# Patient Record
Sex: MALE | Race: WHITE | HISPANIC OR LATINO | Employment: FULL TIME | URBAN - METROPOLITAN AREA
[De-identification: names, ages, dates, MRNs, and addresses within clinical notes are randomized per-mention and may not be internally consistent; named-entity substitution may affect disease eponyms.]

---

## 2017-10-07 ENCOUNTER — HOSPITAL ENCOUNTER (EMERGENCY)
Facility: MEDICAL CENTER | Age: 37
End: 2017-10-07
Attending: EMERGENCY MEDICINE
Payer: COMMERCIAL

## 2017-10-07 ENCOUNTER — APPOINTMENT (OUTPATIENT)
Dept: RADIOLOGY | Facility: MEDICAL CENTER | Age: 37
End: 2017-10-07
Attending: EMERGENCY MEDICINE
Payer: COMMERCIAL

## 2017-10-07 VITALS
SYSTOLIC BLOOD PRESSURE: 135 MMHG | HEIGHT: 70 IN | OXYGEN SATURATION: 96 % | TEMPERATURE: 98 F | BODY MASS INDEX: 45.1 KG/M2 | HEART RATE: 86 BPM | DIASTOLIC BLOOD PRESSURE: 67 MMHG | RESPIRATION RATE: 16 BRPM | WEIGHT: 315 LBS

## 2017-10-07 DIAGNOSIS — S83.402A SPRAIN OF COLLATERAL LIGAMENT OF LEFT KNEE, INITIAL ENCOUNTER: ICD-10-CM

## 2017-10-07 PROCEDURE — 73564 X-RAY EXAM KNEE 4 OR MORE: CPT | Mod: LT

## 2017-10-07 PROCEDURE — 700102 HCHG RX REV CODE 250 W/ 637 OVERRIDE(OP): Performed by: EMERGENCY MEDICINE

## 2017-10-07 PROCEDURE — A9270 NON-COVERED ITEM OR SERVICE: HCPCS | Performed by: EMERGENCY MEDICINE

## 2017-10-07 PROCEDURE — 99284 EMERGENCY DEPT VISIT MOD MDM: CPT

## 2017-10-07 RX ORDER — TRAMADOL HYDROCHLORIDE 50 MG/1
50-100 TABLET ORAL EVERY 6 HOURS PRN
Qty: 20 TAB | Refills: 0 | Status: ON HOLD | OUTPATIENT
Start: 2017-10-07 | End: 2017-10-24

## 2017-10-07 RX ORDER — IBUPROFEN 800 MG/1
800 TABLET ORAL EVERY 8 HOURS PRN
Qty: 30 TAB | Refills: 0 | Status: SHIPPED | OUTPATIENT
Start: 2017-10-07

## 2017-10-07 RX ORDER — HYDROCODONE BITARTRATE AND ACETAMINOPHEN 5; 325 MG/1; MG/1
2 TABLET ORAL ONCE
Status: COMPLETED | OUTPATIENT
Start: 2017-10-07 | End: 2017-10-07

## 2017-10-07 RX ADMIN — HYDROCODONE BITARTRATE AND ACETAMINOPHEN 2 TABLET: 5; 325 TABLET ORAL at 16:08

## 2017-10-07 ASSESSMENT — PAIN SCALES - GENERAL
PAINLEVEL_OUTOF10: 7
PAINLEVEL_OUTOF10: 9
PAINLEVEL_OUTOF10: 10

## 2017-10-07 NOTE — ED PROVIDER NOTES
"ED Provider Note    CHIEF COMPLAINT  Chief Complaint   Patient presents with   • Knee Pain     L knee, states that he was stepping down onto a step while hiking with his kids when his L knee buckled and he fell.        HPI  Ariel Mccormick is a 37 y.o. male who presentsFor evaluation of acute left knee injury. The patient was hiking with his kids. He was descending and felt a snapping popping sensation in his left knee. He experienced immediate pain and swelling. He was unable to bear weight without subjective instability. He denies any injury to the head neck chest abdomen or pelvis. Injury occurred 2 hours prior to arrival. No other complaints    REVIEW OF SYSTEMS  See HPI for further details. No loss of consciousness numbness tingling weakness fevers or chills All other systems are negative.     PAST MEDICAL HISTORY  No past medical history on file.  None reported  FAMILY HISTORY  History of bleeding disorder    SOCIAL HISTORY  Social History     Social History   • Marital status: N/A     Spouse name: N/A   • Number of children: N/A   • Years of education: N/A     Social History Main Topics   • Smoking status: Not on file   • Smokeless tobacco: Not on file   • Alcohol use Not on file   • Drug use: Unknown   • Sexual activity: Not on file     Other Topics Concern   • Not on file     Social History Narrative   • No narrative on file   Denies drugs or alcohol  with kids    SURGICAL HISTORY  No past surgical history on file.  No major surgeries  CURRENT MEDICATIONS  No regular medications    ALLERGIES  No Known Allergies    PHYSICAL EXAM  VITAL SIGNS: /66   Pulse 87   Temp 36.3 °C (97.3 °F)   Resp 16   Ht 1.778 m (5' 10\")   Wt (!) 163.9 kg (361 lb 5.3 oz)   SpO2 95%   BMI 51.85 kg/m²  Room air O2: 95    Constitutional: Well developed, Well nourished, No acute distress, Non-toxic appearance.   HENT: Normocephalic, Atraumatic, Bilateral external ears normal, Oropharynx moist, No oral exudates, " Nose normal.   Eyes: PERRLA, EOMI, Conjunctiva normal, No discharge.   Neck: Normal range of motion, No tenderness, Supple, No stridor.   Cardiovascular: Normal heart rate, Normal rhythm, No murmurs, No rubs, No gallops.   Thorax & Lungs: Normal breath sounds, No respiratory distress, No wheezing, No chest tenderness.   Abdomen: Bowel sounds normal, Soft, No tenderness, No masses, No pulsatile masses.   Skin: Warm, Dry, No erythema, No rash.   Back: No tenderness, No CVA tenderness.   Extremities: Left knee exam is notable for moderate size effusion he could not tolerate ligamentous testing neurovascular exam distally is normal patient cannot fully extend, concerning for possible patellar tendon disruption  Neurologic: Alert & oriented x 3, Normal motor function, Normal sensory function, No focal deficits noted.   Psychiatric: Affect normal, Judgment normal, Mood normal.     RADIOLOGY/PROCEDURES  DX-KNEE COMPLETE 4+ LEFT   Final Result      No evidence of acute fracture or dislocation.      Small joint effusion.      Swelling of the anterior soft tissues of the distal thigh.            COURSE & MEDICAL DECISION MAKING  Pertinent Labs & Imaging studies reviewed. (See chart for details)  Patient given oral pain medicine. Here I suspect he likely has some either internal ligamentous disruption such as ACL PCL etc. and/or possible quadricep and/or patellar tendon disruption. He will require urgent follow-up with Dr. Mckeon with orthopedics as well as outpatient MRI. I placed him in a knee immobilizer and crutches and will provide a prescription for pain medicine and anti-inflammatories    FINAL IMPRESSION  1.   1. Sprain of collateral ligament of left knee, initial encounter      Stable patellar tendon disruption/quad tear    Electronically signed by: Rene Mercer, 10/7/2017 3:47 PM

## 2017-10-07 NOTE — ED NOTES
Ariel Mccormick  37 y.o.  Chief Complaint   Patient presents with   • Knee Pain     L knee, states that he was stepping down onto a step while hiking with his kids when his L knee buckled and he fell.      Pt in obvious discomfort.

## 2017-10-08 NOTE — DISCHARGE INSTRUCTIONS
"Combined Knee Ligament Sprain  Combined knee ligament sprain is a tear of more than one of the major ligaments of the knee. The four knee ligaments are the anterior cruciate ligament (ACL), posterior cruciate ligament (PCL), medial collateral ligament (MCL) and lateral collateral ligament (LCL). Ligaments connect bones. They often cross a joint to hold the bones together. The ligaments of the knee keep the thigh bone (femur) and shinbone (tibia) in alignment. These ligaments allow the joint to move within a certain range of motion. Movement outside this range causes a ligament strain. Injury to multiple ligaments at the same time results in difficulty playing sports and in daily living. The most common multiple knee ligament injury involves the ACL and MCL.  SYMPTOMS   · A \"popping\" sound heard or felt at the time of injury.  · Inability to continue activity after injury.  · Inflammation of the knee within 6 hours after injury.  · Possibly, deformity of the knee.  · Inability to straighten the knee.  · Feeling of the knee giving way or buckling.  · Sometimes, locking of the knee, if the joint cartilage (meniscus) is injured.  · Rarely, numbness, weakness, paralysis, discoloration, or coldness, due to nerve or blood vessel injury.  CAUSES   Spraining of multiple ligaments occurs when a force is placed on the ligaments that exceeds their strength. This is often caused by a direct hit (trauma). It may also be caused by a non-contact injury (hyperextending the knee while twisting it).   RISK INCREASES WITH:  · Contact sports (football, rugby, lacrosse). Sports that involve pivoting, jumping, cutting, or changing direction (basketball, gymnastics, soccer, volleyball). Sports on uneven ground (cross-country running, soccer).  · Poor strength and/or flexibility.  · Improper fitted or padded equipment.  PREVENTION  · Warm up and stretch properly before activity.  · Maintain physical fitness:  ¨ Thigh, leg, and knee " flexibility.  ¨ Muscle strength and endurance.  · Learn and use proper exercise technique.  · Wear proper and well fitting equipment (correct length of cleats for surface).  PROGNOSIS   Without treatment, the knee will continue to give way and become vulnerable to recurring injury. Recurring injury can happen during athletics or daily living. If the injury includes damage to a nerve or artery, the chance of a poor outcome increases. Surgery is often needed to regain stability of the knee.  RELATED COMPLICATIONS   Frequently recurring symptoms, including:  · Knee giving way.  · Joint instability.  · Inflammation.  · Injury to the joint cartilage (meniscus). This may result in locking and/or swelling of the knee.  · Injury to joint (articular) cartilage of the thigh bone or shinbone. This may result in arthritis of the knee.  · Injury to other ligaments of the knee.  · Knee stiffness (loss of knee motion).  · Permanent injury to nerves (numbness, weakness, or paralysis) or arteries.  · Removal (amputation) of the leg, due to nerve or artery injury.  TREATMENT   Treatment first involves medicine and ice, to reduce pain and inflammation. Crutches may be advised, to decrease pain while walking. The knee may be restrained. Rehabilitation focuses on reducing swelling, regaining range of motion, and regaining muscle control and strength. It may also include receiving proper use training, wearing a brace, and education. (Avoid sports that involve pivoting, cutting, changing direction, jumping and landing). Surgery often offers the best chance for full recovery. Surgery from combined ACL/MCL injury involves replacement (reconstruction) of the ACL. This also allows for MCL healing. Despite surgery, some athletes may never return to their prior level of competition. The ability to return to sports depends on the related injuries and demands of the sport.   MEDICATION   · If pain medicine is needed, nonsteroidal  anti-inflammatory medicines (aspirin and ibuprofen), or other minor pain relievers (acetaminophen), are often advised.  · Do not take pain medicine for 7 days before surgery.  · Stronger pain relievers may be prescribed. Use only as directed and only as much as you need.  · Contact your caregiver immediately if any bleeding, stomach upset, or signs of an allergic reaction occur.  COLD THERAPY   Cold treatment (icing) should be applied for 10 to 15 minutes every 2 to 3 hours for inflammation and pain, and immediately after activity that aggravates your symptoms. Use ice packs or an ice massage.  SEEK MEDICAL CARE IF:   · Symptoms get worse or do not improve in 6 weeks, despite treatment.  · After injury or surgery, any of the following occur:  ¨ Pain, numbness, coldness, or a blue, gray, or dark color occurs in the foot or toenails.  ¨ Increased pain, swelling, redness, drainage of fluids, or bleeding in the affected area.  ¨ Signs of infection (headache, muscle aches, dizziness, or a general ill feeling with fever).  ¨ New, unexplained symptoms develop. (Drugs used in treatment may produce side effects.)     This information is not intended to replace advice given to you by your health care provider. Make sure you discuss any questions you have with your health care provider.     Document Released: 12/18/2006 Document Revised: 03/11/2013 Document Reviewed: 04/01/2010  [employer] Interactive Patient Education ©2016 [employer]

## 2017-10-08 NOTE — ED NOTES
Knee immobilizers and crutches provided by ED tech. Patient reports readiness for discharge. Discharge instructions, prescriptions x 2, and follow-up care reviewed with patient. All questions answered and patient verbalized understanding. Vss. Patient stable and ambulatory upon d/c from ED.

## 2017-10-24 ENCOUNTER — HOSPITAL ENCOUNTER (OUTPATIENT)
Facility: MEDICAL CENTER | Age: 37
End: 2017-10-24
Attending: ORTHOPAEDIC SURGERY | Admitting: ORTHOPAEDIC SURGERY
Payer: COMMERCIAL

## 2017-10-24 VITALS
DIASTOLIC BLOOD PRESSURE: 54 MMHG | OXYGEN SATURATION: 95 % | BODY MASS INDEX: 45.1 KG/M2 | HEIGHT: 70 IN | WEIGHT: 315 LBS | RESPIRATION RATE: 17 BRPM | SYSTOLIC BLOOD PRESSURE: 141 MMHG | HEART RATE: 85 BPM | TEMPERATURE: 98.3 F

## 2017-10-24 PROBLEM — S76.192A: Status: ACTIVE | Noted: 2017-10-24

## 2017-10-24 LAB — EKG IMPRESSION: NORMAL

## 2017-10-24 PROCEDURE — 93005 ELECTROCARDIOGRAM TRACING: CPT | Performed by: ORTHOPAEDIC SURGERY

## 2017-10-24 PROCEDURE — 500122 HCHG BOVIE, BLADE: Performed by: ORTHOPAEDIC SURGERY

## 2017-10-24 PROCEDURE — 160028 HCHG SURGERY MINUTES - 1ST 30 MINS LEVEL 3: Performed by: ORTHOPAEDIC SURGERY

## 2017-10-24 PROCEDURE — A9270 NON-COVERED ITEM OR SERVICE: HCPCS

## 2017-10-24 PROCEDURE — 160035 HCHG PACU - 1ST 60 MINS PHASE I: Performed by: ORTHOPAEDIC SURGERY

## 2017-10-24 PROCEDURE — 160046 HCHG PACU - 1ST 60 MINS PHASE II: Performed by: ORTHOPAEDIC SURGERY

## 2017-10-24 PROCEDURE — 160002 HCHG RECOVERY MINUTES (STAT): Performed by: ORTHOPAEDIC SURGERY

## 2017-10-24 PROCEDURE — 93010 ELECTROCARDIOGRAM REPORT: CPT | Performed by: INTERNAL MEDICINE

## 2017-10-24 PROCEDURE — 160047 HCHG PACU  - EA ADDL 30 MINS PHASE II: Performed by: ORTHOPAEDIC SURGERY

## 2017-10-24 PROCEDURE — 160009 HCHG ANES TIME/MIN: Performed by: ORTHOPAEDIC SURGERY

## 2017-10-24 PROCEDURE — 700102 HCHG RX REV CODE 250 W/ 637 OVERRIDE(OP)

## 2017-10-24 PROCEDURE — 500031: Performed by: ORTHOPAEDIC SURGERY

## 2017-10-24 PROCEDURE — 500881 HCHG PACK, EXTREMITY: Performed by: ORTHOPAEDIC SURGERY

## 2017-10-24 PROCEDURE — 700111 HCHG RX REV CODE 636 W/ 250 OVERRIDE (IP)

## 2017-10-24 PROCEDURE — 160039 HCHG SURGERY MINUTES - EA ADDL 1 MIN LEVEL 3: Performed by: ORTHOPAEDIC SURGERY

## 2017-10-24 PROCEDURE — 501838 HCHG SUTURE GENERAL: Performed by: ORTHOPAEDIC SURGERY

## 2017-10-24 PROCEDURE — 160025 RECOVERY II MINUTES (STATS): Performed by: ORTHOPAEDIC SURGERY

## 2017-10-24 PROCEDURE — 160048 HCHG OR STATISTICAL LEVEL 1-5: Performed by: ORTHOPAEDIC SURGERY

## 2017-10-24 PROCEDURE — 160036 HCHG PACU - EA ADDL 30 MINS PHASE I: Performed by: ORTHOPAEDIC SURGERY

## 2017-10-24 RX ORDER — LIDOCAINE HYDROCHLORIDE 10 MG/ML
0.5 INJECTION, SOLUTION INFILTRATION; PERINEURAL
Status: DISCONTINUED | OUTPATIENT
Start: 2017-10-24 | End: 2017-10-24 | Stop reason: HOSPADM

## 2017-10-24 RX ORDER — LISINOPRIL 40 MG/1
40 TABLET ORAL DAILY
COMMUNITY

## 2017-10-24 RX ORDER — OXYCODONE HCL 5 MG/5 ML
SOLUTION, ORAL ORAL
Status: COMPLETED
Start: 2017-10-24 | End: 2017-10-24

## 2017-10-24 RX ORDER — OXYCODONE AND ACETAMINOPHEN 10; 325 MG/1; MG/1
.5-1 TABLET ORAL EVERY 4 HOURS PRN
Qty: 60 TAB | Refills: 0 | Status: SHIPPED | OUTPATIENT
Start: 2017-10-24

## 2017-10-24 RX ORDER — HYDROCHLOROTHIAZIDE 25 MG/1
25 TABLET ORAL DAILY
COMMUNITY

## 2017-10-24 RX ORDER — HALOPERIDOL 5 MG/ML
INJECTION INTRAMUSCULAR
Status: COMPLETED
Start: 2017-10-24 | End: 2017-10-24

## 2017-10-24 RX ORDER — LIDOCAINE AND PRILOCAINE 25; 25 MG/G; MG/G
1 CREAM TOPICAL
Status: DISCONTINUED | OUTPATIENT
Start: 2017-10-24 | End: 2017-10-24 | Stop reason: HOSPADM

## 2017-10-24 RX ORDER — SODIUM CHLORIDE, SODIUM LACTATE, POTASSIUM CHLORIDE, CALCIUM CHLORIDE 600; 310; 30; 20 MG/100ML; MG/100ML; MG/100ML; MG/100ML
INJECTION, SOLUTION INTRAVENOUS CONTINUOUS
Status: DISCONTINUED | OUTPATIENT
Start: 2017-10-24 | End: 2017-10-24 | Stop reason: HOSPADM

## 2017-10-24 RX ORDER — DIPHENHYDRAMINE HYDROCHLORIDE 50 MG/ML
INJECTION INTRAMUSCULAR; INTRAVENOUS
Status: COMPLETED
Start: 2017-10-24 | End: 2017-10-24

## 2017-10-24 RX ORDER — HYDROCODONE BITARTRATE AND ACETAMINOPHEN 5; 325 MG/1; MG/1
1 TABLET ORAL
Status: ON HOLD | COMMUNITY
End: 2017-10-24

## 2017-10-24 RX ADMIN — SODIUM CHLORIDE, SODIUM LACTATE, POTASSIUM CHLORIDE, CALCIUM CHLORIDE: 600; 310; 30; 20 INJECTION, SOLUTION INTRAVENOUS at 11:52

## 2017-10-24 RX ADMIN — OXYCODONE HYDROCHLORIDE 10 MG: 5 SOLUTION ORAL at 16:45

## 2017-10-24 RX ADMIN — HALOPERIDOL LACTATE 1 MG: 5 INJECTION, SOLUTION INTRAMUSCULAR at 16:05

## 2017-10-24 RX ADMIN — DIPHENHYDRAMINE HYDROCHLORIDE 12.5 MG: 50 INJECTION INTRAMUSCULAR; INTRAVENOUS at 16:22

## 2017-10-24 ASSESSMENT — PAIN SCALES - GENERAL
PAINLEVEL_OUTOF10: 0
PAINLEVEL_OUTOF10: 2
PAINLEVEL_OUTOF10: 0
PAINLEVEL_OUTOF10: 2
PAINLEVEL_OUTOF10: 0
PAINLEVEL_OUTOF10: 2
PAINLEVEL_OUTOF10: 6
PAINLEVEL_OUTOF10: 4

## 2017-10-24 NOTE — OP REPORT
DATE OF SERVICE:  10/24/2017    PREOPERATIVE DIAGNOSIS:  Left quadriceps tendon rupture.    POSTOPERATIVE DIAGNOSIS:  Left quadriceps tendon rupture.    PROCEDURE PERFORMED:  Repair of left quadriceps tendon rupture.    SURGEON:  Festus Mckeon MD    ANESTHESIOLOGIST:  Eitan Munoz DO    ANESTHESIA:  General and regional.    ASSISTANT:  Atif Young PA-C    ESTIMATED BLOOD LOSS:  Minimal.    TOURNIQUET TIME:  45 minutes at 250 mmHg, left thigh.    INDICATION FOR PROCEDURE:  The patient is a 37-year-old male who is about 2   weeks status post rupture of his quadriceps tendon with some portions of the   deep fibers intact, but otherwise his rectus and vastus lateralis and medialis   complete tears.  This was confirmed on MRI.  I discussed with him treatment   options.  He elected to proceed with surgical repair.  He signed informed   consent preoperatively.    DESCRIPTION OF PROCEDURE:  The patient was met in the preoperative holding   area.  His surgical site was signed and his consent was confirmed for   accuracy.  Dr. Munoz performed a regional anesthetic at my request to help   with postoperative pain control.  He was then taken back to the operating room   and general anesthesia was induced.  Ancef was administered.  Tourniquet was   applied to the left thigh.  The left lower extremity was then prepped and   draped in the usual sterile fashion.  A formal timeout was performed to   confirm patient's correct name, correct surgical site, correct procedure and   correct laterality.  The limb was then exsanguinated with an Esmarch and the   tourniquet was inflated to 250 mmHg.  A midline longitudinal incision was made   over the quadriceps tendon within scalpel down through skin.  Dissection was   carried down to subcutaneous tissue to the fascia overlying the quadriceps   tendon and the patella.  This was split longitudinally.  There was serous   fluid present within the rupture site at the quadriceps  tendon.  There was   complete rupture off of the bone at the superficial portion of the quadriceps   tendon with some retraction proximally.  The deep fibers were intact   centrally.  There was some rupture of the retinaculum in a transverse fashion   medially and laterally.  Thorough preparation of the superficial insertion   area of the bone on patella was performed with a rongeur.  I cleaned up the   early healing tissue off of the end of the quadriceps tendon in the area of   the patella.  I then drilled three 2.0 mm drill holes through the patellar   tendon, I have said that I had prepared in the proximal pole.  I then used a   total of two #5 FiberWire suture in a locking running Krackow type   configuration to medial and lateral aspects and the central aspect of the   patellar tendon and then brought the sutures through the bone tunnels and   securely tied them down into place without undue tension.  I then reinforced   the superficial layer with running #2 FiberWire as well as the retinaculum   with #2 FiberWire.  The wound was thoroughly irrigated.  I repaired the fascia   layers with interrupted #1 Vicryl, subQ layers with 0 Vicryl, 2-0 Vicryl, and   the skin edges with staples.  Wound was thoroughly cleansed and dried and a   sterile compressive dressing was applied.  Tourniquet was deflated after 45   minutes and good blood flow returned to the foot.  He was then placed into a   hinged knee brace locked in full extension.  He was then awoken from   anesthesia and transferred on the [facility] and taken to postanesthesia care unit   in stable condition.    Atif Young PA-C, was present for the duration of the procedure and assisted   with patient positioning, retracting and wound closure.    PLAN:  1.  The patient will be discharged home from the PACU when he recovers from   anesthesia.  2.  He can be weightbearing as tolerated in his knee brace locked in full   extension, but should not perform any knee  flexion.  3.  He should follow up in 10-14 days postop for routine wound check.  4.  I will discharge him home with a prescription for Percocet as needed for   pain.       ____________________________________     MD ERVIN Henry / [facility]    DD:  10/24/2017 15:47:57  DT:  10/24/2017 16:03:36    D#:  [id number]  Job#:  [id number]

## 2017-10-25 NOTE — DISCHARGE INSTRUCTIONS
ACTIVITY: Rest and take it easy for the first 24 hours.  A responsible adult is recommended to remain with you during that time.  It is normal to feel sleepy.  We encourage you to not do anything that requires balance, judgment or coordination.    MILD FLU-LIKE SYMPTOMS ARE NORMAL. YOU MAY EXPERIENCE GENERALIZED MUSCLE ACHES, THROAT IRRITATION, HEADACHE AND/OR SOME NAUSEA.    FOR 24 HOURS DO NOT:  Drive, operate machinery or run household appliances.  Drink beer or alcoholic beverages.   Make important decisions or sign legal documents.    SPECIAL INSTRUCTIONS & SURGICAL DRESSING/BATHING:   --Okay to change dressing after 72 hrs (Friday) if needed, keep incision clean/dry/covered   --Weightbearing as tolerated left lower extremity in knee brace locked in extension only   --No knee flexion   --Prescription for percocet as needed for pain   --Follow up in 10-14 days postop    Peripheral Nerve Block Discharge Instructions from Same Day Surgery and Inpatient :    What to Expect - Lower Extremity  · The block may cause you to experience numbness and weakness in your hip and thigh or thigh and knee on the same side as your surgery  · Numbness, tingling and / or weakness are all normal. For some people, this may be an unpleasant sensation  · These issues will be resolved when the local anesthetic wears off   · You may experience numbness and tingling in your thigh on the same side as your surgery if the block medicine was injected at your groin area  · Numbness will make it difficult to walk  · You may have problems with balance and walking so be very careful   · Follow your surgeon's direction regarding weight bearing on your surgical limb  · Be very careful with your numb limb  Precautions  · The numbness may affect your balance  · Be careful when walking or moving around  · Your leg may be weak: be very careful putting weight on it  · If your surgeon did not specify a time, you should not bear weight for 24  "hours  · Be sure to ask for help when you need it  · It is better to have help than to fall and hurt yourself  Prevent Injury  · Protect the limb like a baby  · Beware of exposing your limb to extreme heat or cold or trauma  · The limb may be injured without you noticing because it is numb  · Keep the limb elevated whenever possible  · Do not sleep on the limb  · Change the position of the limb regularly  · Avoid putting pressure on your surgical limb  Pain Control  · The initial block on the day of surgery will make your extremity feel \"numb\"  · Any consecutive injection including prior to discharge from the hospital will make your extremity feel \"numb\"  · You may feel an aching or burning when the local anesthesia starts to wear off  · Take pain pills as prescribed by your surgeon  · Call your surgeon or anesthesiologist if you do not have adequate pain control    DIET: To avoid nausea, slowly advance diet as tolerated, avoiding spicy or greasy foods for the first day.  Add more substantial food to your diet according to your physician's instructions.  INCREASE FLUIDS AND FIBER TO AVOID CONSTIPATION.    FOLLOW-UP APPOINTMENT:  A follow-up appointment should be arranged with your doctor in 10-14 days; call to schedule.    You should CALL YOUR PHYSICIAN if you develop:  Fever greater than 101 degrees F.  Pain not relieved by medication, or persistent nausea or vomiting.  Excessive bleeding (blood soaking through dressing) or unexpected drainage from the wound.  Extreme redness or swelling around the incision site, drainage of pus or foul smelling drainage.  Inability to urinate or empty your bladder within 8 hours.  Problems with breathing or chest pain.    You should call 911 if you develop problems with breathing or chest pain.  If you are unable to contact your doctor or surgical center, you should go to the nearest emergency room or urgent care center.  Physician's telephone #: [phone]    If any questions " arise, call your doctor.  If your doctor is not available, please feel free to call the Surgical Center at [phone].  The Center is open Monday through Friday from 7AM to 7PM.  You can also call the HEALTH HOTLINE open 24 hours/day, 7 days/week and speak to a nurse at [phone], or toll free at [phone].    A registered nurse may call you a few days after your surgery to see how you are doing after your procedure.    MEDICATIONS: Resume taking daily medication.  Take prescribed pain medication with food.  If no medication is prescribed, you may take non-aspirin pain medication if needed.  PAIN MEDICATION CAN BE VERY CONSTIPATING.  Take a stool softener or laxative such as senokot, pericolace, or milk of magnesia if needed.    Prescription given for Percocet.  Last pain medication given at 4:45 pm.    If your physician has prescribed pain medication that includes Acetaminophen (Tylenol), do not take additional Acetaminophen (Tylenol) while taking the prescribed medication.    Depression / Suicide Risk    As you are discharged from this [facility] facility, it is important to learn how to keep safe from harming yourself.    Recognize the warning signs:  · Abrupt changes in personality, positive or negative- including increase in energy   · Giving away possessions  · Change in eating patterns- significant weight changes-  positive or negative  · Change in sleeping patterns- unable to sleep or sleeping all the time   · Unwillingness or inability to communicate  · Depression  · Unusual sadness, discouragement and loneliness  · Talk of wanting to die  · Neglect of personal appearance   · Rebelliousness- reckless behavior  · Withdrawal from people/activities they love  · Confusion- inability to concentrate     If you or a loved one observes any of these behaviors or has concerns about self-harm, here's what you can do:  · Talk about it- your feelings and reasons for harming yourself  · Remove any means  that you might use to hurt yourself (examples: pills, rope, extension cords, firearm)  · Get professional help from the community (Mental Health, Substance Abuse, psychological counseling)  · Do not be alone:Call your Safe Contact- someone whom you trust who will be there for you.  · Call your local CRISIS HOTLINE 348-4825 or 720-231-2004  · Call your local Children's Mobile Crisis Response Team [place] (630) 366-3580 or www.[employer]  · Call the toll free National Suicide Prevention Hotlines   · National Suicide Prevention Lifeline 051-139-NOMJ (5570)  National Hope Line Network 800-SUICIDE (478-0815)

## 2017-10-25 NOTE — OR NURSING
Pt had some intial nausea medicated per orders with good relief. C/o feeling hot cooling fan in place to provide more comfort. Had femerol block so pain is minimal but did medicated with oxycodone and gave script to wife to go fill at [facility]. Pt with sleep apnea has c pap at home advised to use once at home tonight. sats 94-96% on room air. Dressing to leg cdi and leg brace brought from home back on. Report to pacu 2 Rn and transported to dc area. Pt wife made aware of plan of care and dc plan.

## 2023-05-25 NOTE — ED NOTES
Patient transported to imaging.   Adbry Pregnancy And Lactation Text: It is unknown if this medication will adversely affect pregnancy or breast feeding.

## (undated) DEVICE — SET EXTENSION WITH 2 PORTS (48EA/CA) ***PART #2C8610 IS A SUBSTITUTE*****

## (undated) DEVICE — DETERGENT RENUZYME PLUS 10 OZ PACKET (50/BX)

## (undated) DEVICE — NEPTUNE 4 PORT MANIFOLD - (20/PK)

## (undated) DEVICE — CORDS BIPOLAR COAGULATION - 12FT STERILE DISP. (10EA/BX)

## (undated) DEVICE — BOVIE BLADE COATED - (50/PK)

## (undated) DEVICE — TUBING CLEARLINK DUO-VENT - C-FLO (48EA/CA)

## (undated) DEVICE — STOCKINETTE, TUBULAR 4 COTTON

## (undated) DEVICE — ELECTRODE DUAL RETURN W/ CORD - (50/PK)

## (undated) DEVICE — GLOVE BIOGEL INDICATOR SZ 7SURGICAL PF LTX - (50/BX 4BX/CA)

## (undated) DEVICE — CHLORAPREP 26 ML APPLICATOR - ORANGE TINT(25/CA)

## (undated) DEVICE — KIT ROOM DECONTAMINATION

## (undated) DEVICE — KIT ANESTHESIA W/CIRCUIT & 3/LT BAG W/FILTER (20EA/CA)

## (undated) DEVICE — LACTATED RINGERS INJ 1000 ML - (14EA/CA 60CA/PF)

## (undated) DEVICE — SUTURE 4-0 PROLENE PS-2 18 (36PK/BX)"

## (undated) DEVICE — GOWN WARMING STANDARD FLEX - (30/CA)

## (undated) DEVICE — MASK ANESTHESIA ADULT  - (100/CA)

## (undated) DEVICE — ELECTRODE 850 FOAM ADHESIVE - HYDROGEL RADIOTRNSPRNT (50/PK)

## (undated) DEVICE — WATER IRRIG. STER. 1500 ML - (9/CA)

## (undated) DEVICE — PACK LOWER EXTREMITY - (2/CA)

## (undated) DEVICE — SPEAR EYE SPNG 3ANG MLBL HNDL - (10/ST18ST/PK 180/PK 1PK/SP)

## (undated) DEVICE — GLOVE BIOGEL SZ 6.5 SURGICAL PF LTX (50PR/BX 4BX/CA)

## (undated) DEVICE — GLOVE BIOGEL SZ 8 SURGICAL PF LTX - (50PR/BX 4BX/CA)

## (undated) DEVICE — SENSOR SPO2 NEO LNCS ADHESIVE (20/BX) SEE USER NOTES

## (undated) DEVICE — Device

## (undated) DEVICE — GLOVE BIOGEL ECLIPSE  PF LATEX SIZE 6.5 (50PR/BX)

## (undated) DEVICE — HEAD HOLDER JUNIOR/ADULT

## (undated) DEVICE — SUTURE GENERAL

## (undated) DEVICE — SET LEADWIRE 5 LEAD BEDSIDE DISPOSABLE ECG (1SET OF 5/EA)

## (undated) DEVICE — GLOVE BIOGEL SZ 6 PF LATEX - (50EA/BX 4BX/CA)

## (undated) DEVICE — PROTECTOR ULNA NERVE - (36PR/CA)

## (undated) DEVICE — SUCTION INSTRUMENT YANKAUER BULBOUS TIP W/O VENT (50EA/CA)

## (undated) DEVICE — CANISTER SUCTION 3000ML MECHANICAL FILTER AUTO SHUTOFF MEDI-VAC NONSTERILE LF DISP  (40EA/CA)

## (undated) DEVICE — SODIUM CHL IRRIGATION 0.9% 1000ML (12EA/CA)